# Patient Record
Sex: MALE | Race: WHITE
[De-identification: names, ages, dates, MRNs, and addresses within clinical notes are randomized per-mention and may not be internally consistent; named-entity substitution may affect disease eponyms.]

---

## 2022-07-04 ENCOUNTER — HOSPITAL ENCOUNTER (EMERGENCY)
Dept: HOSPITAL 54 - ER | Age: 42
Discharge: HOME | End: 2022-07-04
Payer: COMMERCIAL

## 2022-07-04 VITALS — SYSTOLIC BLOOD PRESSURE: 127 MMHG | DIASTOLIC BLOOD PRESSURE: 69 MMHG

## 2022-07-04 VITALS — WEIGHT: 210 LBS | HEIGHT: 70 IN | BODY MASS INDEX: 30.06 KG/M2

## 2022-07-04 DIAGNOSIS — A51.0: Primary | ICD-10-CM

## 2022-07-04 PROCEDURE — 99283 EMERGENCY DEPT VISIT LOW MDM: CPT

## 2022-07-04 PROCEDURE — 87591 N.GONORRHOEAE DNA AMP PROB: CPT

## 2022-07-04 PROCEDURE — 87806 HIV AG W/HIV1&2 ANTB W/OPTIC: CPT

## 2022-07-04 PROCEDURE — 87491 CHLMYD TRACH DNA AMP PROBE: CPT

## 2022-07-04 PROCEDURE — 96372 THER/PROPH/DIAG INJ SC/IM: CPT

## 2022-07-04 PROCEDURE — 86592 SYPHILIS TEST NON-TREP QUAL: CPT

## 2022-07-04 PROCEDURE — 36415 COLL VENOUS BLD VENIPUNCTURE: CPT

## 2022-07-04 PROCEDURE — 86593 SYPHILIS TEST NON-TREP QUANT: CPT

## 2023-03-23 ENCOUNTER — HOSPITAL ENCOUNTER (EMERGENCY)
Dept: HOSPITAL 54 - ER | Age: 43
Discharge: HOME | End: 2023-03-23
Payer: COMMERCIAL

## 2023-03-23 VITALS — DIASTOLIC BLOOD PRESSURE: 85 MMHG | SYSTOLIC BLOOD PRESSURE: 153 MMHG

## 2023-03-23 VITALS — WEIGHT: 210 LBS | HEIGHT: 69 IN | BODY MASS INDEX: 31.1 KG/M2

## 2023-03-23 DIAGNOSIS — R10.9: Primary | ICD-10-CM

## 2023-03-23 DIAGNOSIS — R14.0: ICD-10-CM

## 2023-03-23 LAB
BILIRUB UR QL STRIP: (no result)
COLOR UR: YELLOW
DEPRECATED SQUAMOUS URNS QL MICRO: (no result) /HPF
GLUCOSE UR STRIP-MCNC: NEGATIVE MG/DL
LEUKOCYTE ESTERASE UR QL STRIP: NEGATIVE
NITRITE UR QL STRIP: NEGATIVE
PH UR STRIP: 6 [PH] (ref 5–8)
PROT UR QL STRIP: NEGATIVE MG/DL
RBC #/AREA URNS HPF: (no result) /HPF (ref 0–2)
UROBILINOGEN UR STRIP-MCNC: 0.2 EU/DL
WBC #/AREA URNS HPF: (no result) /HPF (ref 0–3)

## 2023-03-23 NOTE — NUR
-------------------------------------------------------------------------------

          *** Note rangel in EDM - 03/23/23 at 1945 by BRAD ***          

-------------------------------------------------------------------------------

Patient discharged to home in stable condition. Written and verbal after care 
instructions given. Patient verbalizes understanding of instruction.IV removed. 
Catheter intact and site benign. Pressure and 4x4 applied to site. No bleeding 
noted. Pt ambulatory with a steady gait Cr renal function remains impaired but stable  Suspect volume depletion as main etiology,   Start gentle IV hydration today  Avoid all possible nephrotoxins   Monitor

## 2023-10-12 NOTE — NUR
Patient discharged to home in stable condition. Written and verbal after care 
instructions given. Patient verbalizes understanding of instruction. Pt 
ambulatory with a steady gait None felt